# Patient Record
Sex: MALE | Race: WHITE | NOT HISPANIC OR LATINO | Employment: OTHER | URBAN - NONMETROPOLITAN AREA
[De-identification: names, ages, dates, MRNs, and addresses within clinical notes are randomized per-mention and may not be internally consistent; named-entity substitution may affect disease eponyms.]

---

## 2017-03-29 ENCOUNTER — OFFICE VISIT (OUTPATIENT)
Dept: ORTHOPEDIC SURGERY | Facility: CLINIC | Age: 52
End: 2017-03-29

## 2017-03-29 VITALS — BODY MASS INDEX: 35.22 KG/M2 | RESPIRATION RATE: 18 BRPM | WEIGHT: 251.6 LBS | HEIGHT: 71 IN

## 2017-03-29 DIAGNOSIS — S42.211A FRACTURE, HUMERUS, NECK, RIGHT, CLOSED, INITIAL ENCOUNTER: Primary | ICD-10-CM

## 2017-03-29 DIAGNOSIS — M25.511 RIGHT SHOULDER PAIN, UNSPECIFIED CHRONICITY: Primary | ICD-10-CM

## 2017-03-29 PROCEDURE — 99203 OFFICE O/P NEW LOW 30 MIN: CPT | Performed by: ORTHOPAEDIC SURGERY

## 2017-03-29 RX ORDER — LOSARTAN POTASSIUM AND HYDROCHLOROTHIAZIDE 25; 100 MG/1; MG/1
TABLET ORAL
Refills: 2 | COMMUNITY
Start: 2017-03-13 | End: 2020-08-30 | Stop reason: SDUPTHER

## 2017-03-29 RX ORDER — PYRAZINAMIDE 500 MG/1
1-2 TABLET ORAL EVERY 4 HOURS PRN
Qty: 60 TABLET | Refills: 0 | Status: SHIPPED | OUTPATIENT
Start: 2017-03-29 | End: 2017-03-29 | Stop reason: ALTCHOICE

## 2017-03-29 RX ORDER — FLUOXETINE HYDROCHLORIDE 40 MG/1
CAPSULE ORAL
Refills: 2 | COMMUNITY
Start: 2017-03-13 | End: 2020-06-27

## 2017-03-29 NOTE — PROGRESS NOTES
Subjective   Patient ID: Guero Casey is a 51 y.o. male  Pain and Edema of the Right Shoulder (Patient stated that on 3/25/2017 was sowing grass seed on an inclined area at home, laid straw and didn't realize the straw was wet, patient slipped and fell causing injury. Patient is right hand dominant.)             History of Present Illness  Complained of pain initially at the lateral aspect of his upper arm that radiated down towards his elbow since being seen in the emergency department elbow and wrist pain have subsided bruising has extended down anteriorly at the right proximal arm towards the biceps area.  Denies neck pain or paresthesias.  His more comfortable using a sling, medications for pain have helped he is on chronic Suboxone medications as well told by his PCP to follow-up here for further treatment and pain medications as needed.  Is chronically disabled.      Review of Systems   Constitutional: Negative for diaphoresis, fever and unexpected weight change.   HENT: Negative for dental problem and sore throat.    Eyes: Negative for visual disturbance.   Respiratory: Negative for shortness of breath.    Cardiovascular: Negative for chest pain.   Gastrointestinal: Negative for abdominal pain, constipation, diarrhea, nausea and vomiting.   Genitourinary: Negative for difficulty urinating and frequency.   Musculoskeletal: Positive for arthralgias.   Neurological: Negative for headaches.   Hematological: Does not bruise/bleed easily.   All other systems reviewed and are negative.      Past Medical History:   Diagnosis Date   • Anxiety    • Back pain    • Depression    • Depression with anxiety    • GERD (gastroesophageal reflux disease)    • Hyperlipidemia    • Hypertension    • Hypothyroidism    • Leg pain    • PVD (peripheral vascular disease)         Past Surgical History:   Procedure Laterality Date   • ABDOMINAL SURGERY      Exploratory   • CHOLECYSTECTOMY     • FEMORAL POPLITEAL BYPASS Left      "04/19/2016   • HERNIA REPAIR      x2       Family History   Problem Relation Age of Onset   • Coronary artery disease Mother    • Depression Mother    • Stroke Father    • Cerebral aneurysm Father        Social History     Social History   • Marital status:      Spouse name: N/A   • Number of children: N/A   • Years of education: N/A     Occupational History   • Not on file.     Social History Main Topics   • Smoking status: Current Every Day Smoker     Years: 30.00     Types: Cigarettes   • Smokeless tobacco: Not on file      Comment: Smoking 4 cigarettes a day   • Alcohol use No   • Drug use: Not on file   • Sexual activity: Not on file     Other Topics Concern   • Not on file     Social History Narrative       No Known Allergies    Objective   Resp 18  Ht 71\" (180.3 cm)  Wt 251 lb 9.6 oz (114 kg)  BMI 35.09 kg/m2   Physical Exam  Constitutional: He is oriented to person, place, and time. He appears well-developed and well-nourished.   HENT:   Head: Normocephalic and atraumatic.   Eyes: EOM are normal. Pupils are equal, round, and reactive to light.   Neck: Normal range of motion. Neck supple.   Cardiovascular: Normal rate.    Pulmonary/Chest: Effort normal and breath sounds normal.   Abdominal: Soft.   Neurological: He is alert and oriented to person, place, and time.   Skin: Skin is warm and dry.   Psychiatric: He has a normal mood and affect.   Nursing note and vitals reviewed.       Ortho Exam   Right proximal humerus with ecchymosis tenderness at the proximal lateral aspect of the upper arm, continued good joint nontender non-ecchymotic no tenderness at the distal clavicle, arm with minimal swelling distal to the elbow no radial head tenderness full supination pronation full wrist volar flexion dorsiflexion with no pain at the distal radius anatomic snuffbox or proximal carpal row.  Gross sensory motor function intact to the right hand limited forward flexion the shoulder with pain anteriorly at the " right proximal humerus.    Assessment/Plan   Review of Radiographic Studies:    Radiographic images today of fracture I personally viewed and confirm satisfactory alignment.      Procedures        Orthopedic activities reviewed and patient expressed appreciation and Risk, benefits, and merits of treatment alternatives reviewed with the patient and questions answered      Recommendations/Plan:   Work/Activity Status: No use of involved extremity      Patient agreeable to call or return sooner for any concerns.             Impression:  Nondisplaced right greater tuberosity proximal humerus fracture  Plan:  Follow up 2 weeks right shoulder x-rays 3 views on arrival to confirm no further displacement of tuberosity fracture, Tylenol 3 w codeine for pain, icing, continued use of sling, will refer to therapy after next visit    Discussed use of his narcotic medications with his chronic pain management specialist who would prefer to increase his Suboxone dose and add hydrocodone rather than use Tylenol No. 3 with codeine.  The pain management specialist will manage his pain medications

## 2017-04-07 DIAGNOSIS — Z09 FOLLOW-UP EXAM: Primary | ICD-10-CM

## 2017-04-12 ENCOUNTER — OFFICE VISIT (OUTPATIENT)
Dept: ORTHOPEDIC SURGERY | Facility: CLINIC | Age: 52
End: 2017-04-12

## 2017-04-12 VITALS — RESPIRATION RATE: 19 BRPM | WEIGHT: 251.6 LBS | HEIGHT: 71 IN | BODY MASS INDEX: 35.22 KG/M2

## 2017-04-12 DIAGNOSIS — S42.294D OTHER CLOSED NONDISPLACED FRACTURE OF PROXIMAL END OF RIGHT HUMERUS WITH ROUTINE HEALING, SUBSEQUENT ENCOUNTER: Primary | ICD-10-CM

## 2017-04-12 DIAGNOSIS — Z09 FOLLOW-UP EXAM: ICD-10-CM

## 2017-04-12 PROCEDURE — 99213 OFFICE O/P EST LOW 20 MIN: CPT | Performed by: ORTHOPAEDIC SURGERY

## 2017-04-12 NOTE — PROGRESS NOTES
Subjective   Patient ID: Guero Casey is a 51 y.o. male  Pain and Follow-up of the Right Shoulder (Patient stated is experiencing an increase in pain. Patient is right hand dominant.)             History of Present Illness    Here for routine follow-up proximal humeral fracture.  Has cramping and spasm in the anterior biceps region feels better without use of his sling no new injuries    Review of Systems   Constitutional: Negative for diaphoresis, fever and unexpected weight change.   HENT: Negative for dental problem and sore throat.    Eyes: Negative for visual disturbance.   Respiratory: Negative for shortness of breath.    Cardiovascular: Negative for chest pain.   Gastrointestinal: Negative for abdominal pain, constipation, diarrhea, nausea and vomiting.   Genitourinary: Negative for difficulty urinating and frequency.   Musculoskeletal: Positive for arthralgias.   Neurological: Negative for headaches.   Hematological: Does not bruise/bleed easily.   All other systems reviewed and are negative.      Past Medical History:   Diagnosis Date   • Anxiety    • Back pain    • Depression    • Depression with anxiety    • Fracture, humerus     Right    • GERD (gastroesophageal reflux disease)    • Hyperlipidemia    • Hypertension    • Hypothyroidism    • Leg pain    • Prostate enlargement    • PVD (peripheral vascular disease)         Past Surgical History:   Procedure Laterality Date   • ABDOMINAL SURGERY      Exploratory   • CHOLECYSTECTOMY     • FEMORAL POPLITEAL BYPASS Left     04/19/2016   • HERNIA REPAIR      x2       Family History   Problem Relation Age of Onset   • Coronary artery disease Mother    • Depression Mother    • Osteoporosis Mother    • Hypertension Mother    • Stroke Father    • Cerebral aneurysm Father    • Diverticulitis Father        Social History     Social History   • Marital status:      Spouse name: N/A   • Number of children: N/A   • Years of education: N/A     Occupational  "History   • Not on file.     Social History Main Topics   • Smoking status: Current Every Day Smoker     Packs/day: 1.00     Years: 20.00     Types: Cigarettes   • Smokeless tobacco: Not on file      Comment: Smoking 4 cigarettes a day   • Alcohol use No   • Drug use: Yes      Comment: Previously    • Sexual activity: Defer     Other Topics Concern   • Not on file     Social History Narrative       No Known Allergies    Objective   Resp 19  Ht 71\" (180.3 cm)  Wt 251 lb 9.6 oz (114 kg)  BMI 35.09 kg/m2   Physical Exam  Constitutional: He is oriented to person, place, and time. He appears well-developed and well-nourished.   HENT:   Head: Normocephalic and atraumatic.   Eyes: EOM are normal. Pupils are equal, round, and reactive to light.   Neck: Normal range of motion. Neck supple.   Cardiovascular: Normal rate.    Pulmonary/Chest: Effort normal and breath sounds normal.   Abdominal: Soft.   Neurological: He is alert and oriented to person, place, and time.   Skin: Skin is warm and dry.   Psychiatric: He has a normal mood and affect.   Nursing note and vitals reviewed.       Ortho Exam   Right shoulder tender at the proximal humeral area much less swelling proximally but minimal to moderate swelling at the elbow and forearm no tenderness at the radial head, lateral or medial condylar regions of the elbow, arm neurovascularly intact    Assessment/Plan   Review of Radiographic Studies:    Radiographic images today of fracture I personally viewed and confirm satisfactory alignment.      Procedures        Physical therapy referral given      Recommendations/Plan:   Work/Activity Status: No use of involved extremity      Patient agreeable to call or return sooner for any concerns.             Impression:  Healing right hand dominant proximal humeral nondisplaced greater tuberosity fracture  Plan:  Refer to therapy for mobilization as tolerated, recheck 3 weeks x-rays right shoulder 3 views at that time  "

## 2017-05-02 DIAGNOSIS — Z09 FOLLOW-UP EXAM: Primary | ICD-10-CM

## 2017-05-05 DIAGNOSIS — Z09 FOLLOW-UP EXAM: Primary | ICD-10-CM

## 2018-11-02 ENCOUNTER — TRANSCRIBE ORDERS (OUTPATIENT)
Dept: ULTRASOUND IMAGING | Facility: HOSPITAL | Age: 53
End: 2018-11-02

## 2018-11-02 DIAGNOSIS — I73.9 CLAUDICATION (HCC): Primary | ICD-10-CM

## 2019-01-09 RX ORDER — ASPIRIN 81 MG/1
TABLET ORAL
Qty: 30 TABLET | OUTPATIENT
Start: 2019-01-09

## 2019-01-09 RX ORDER — CARVEDILOL 25 MG/1
TABLET ORAL
Qty: 60 TABLET | OUTPATIENT
Start: 2019-01-09

## 2019-01-09 RX ORDER — ATORVASTATIN CALCIUM 40 MG/1
TABLET, FILM COATED ORAL
Qty: 30 TABLET | OUTPATIENT
Start: 2019-01-09

## 2019-01-09 RX ORDER — HYDRALAZINE HYDROCHLORIDE 100 MG/1
TABLET, FILM COATED ORAL
Qty: 90 TABLET | OUTPATIENT
Start: 2019-01-09

## 2019-01-15 RX ORDER — CARVEDILOL 25 MG/1
TABLET ORAL
Qty: 60 TABLET | OUTPATIENT
Start: 2019-01-15

## 2019-01-15 RX ORDER — LOSARTAN POTASSIUM AND HYDROCHLOROTHIAZIDE 25; 100 MG/1; MG/1
TABLET ORAL
Qty: 30 TABLET | OUTPATIENT
Start: 2019-01-15

## 2019-02-27 ENCOUNTER — TRANSCRIBE ORDERS (OUTPATIENT)
Dept: ULTRASOUND IMAGING | Facility: HOSPITAL | Age: 54
End: 2019-02-27

## 2019-02-27 DIAGNOSIS — I10 HYPERTENSION, UNSPECIFIED TYPE: Primary | ICD-10-CM

## 2019-04-04 ENCOUNTER — TRANSCRIBE ORDERS (OUTPATIENT)
Dept: ULTRASOUND IMAGING | Facility: HOSPITAL | Age: 54
End: 2019-04-04

## 2019-04-04 DIAGNOSIS — R89.9 ABNORMAL LABORATORY TEST: Primary | ICD-10-CM

## 2019-04-11 RX ORDER — LANSOPRAZOLE 15 MG/1
CAPSULE, DELAYED RELEASE ORAL
Qty: 30 CAPSULE | Refills: 5 | Status: SHIPPED | OUTPATIENT
Start: 2019-04-11 | End: 2020-08-30 | Stop reason: SDUPTHER

## 2019-05-06 RX ORDER — CETIRIZINE HYDROCHLORIDE 10 MG/1
TABLET ORAL
Qty: 30 TABLET | OUTPATIENT
Start: 2019-05-06

## 2019-05-09 RX ORDER — TIOTROPIUM BROMIDE INHALATION SPRAY 3.12 UG/1
SPRAY, METERED RESPIRATORY (INHALATION)
Qty: 4 G | OUTPATIENT
Start: 2019-05-09

## 2019-05-31 ENCOUNTER — TRANSCRIBE ORDERS (OUTPATIENT)
Dept: ULTRASOUND IMAGING | Facility: HOSPITAL | Age: 54
End: 2019-05-31

## 2019-05-31 DIAGNOSIS — I10 UNCONTROLLED HYPERTENSION: Primary | ICD-10-CM

## 2019-06-10 DIAGNOSIS — R07.9 CHEST PAIN, UNSPECIFIED TYPE: Primary | ICD-10-CM

## 2019-06-10 DIAGNOSIS — I10 HYPERTENSION, UNSPECIFIED TYPE: ICD-10-CM

## 2019-06-14 RX ORDER — TIOTROPIUM BROMIDE INHALATION SPRAY 3.12 UG/1
SPRAY, METERED RESPIRATORY (INHALATION)
Qty: 4 G | OUTPATIENT
Start: 2019-06-14

## 2019-07-08 RX ORDER — IBUPROFEN 800 MG/1
TABLET ORAL
Qty: 120 TABLET | OUTPATIENT
Start: 2019-07-08

## 2019-07-08 RX ORDER — CILOSTAZOL 50 MG/1
TABLET ORAL
Qty: 30 TABLET | OUTPATIENT
Start: 2019-07-08

## 2019-07-08 RX ORDER — LOSARTAN POTASSIUM AND HYDROCHLOROTHIAZIDE 25; 100 MG/1; MG/1
TABLET ORAL
Qty: 30 TABLET | OUTPATIENT
Start: 2019-07-08

## 2019-07-08 RX ORDER — DULOXETIN HYDROCHLORIDE 60 MG/1
CAPSULE, DELAYED RELEASE ORAL
Qty: 30 CAPSULE | OUTPATIENT
Start: 2019-07-08

## 2020-02-04 ENCOUNTER — TELEPHONE (OUTPATIENT)
Dept: CARDIOLOGY | Facility: CLINIC | Age: 55
End: 2020-02-04

## 2020-02-04 NOTE — TELEPHONE ENCOUNTER
Attempted to contact patient in regards to missed appointment. Patient's number is invalid.  Letter mailed to patient.

## 2020-05-10 ENCOUNTER — HOSPITAL ENCOUNTER (EMERGENCY)
Facility: HOSPITAL | Age: 55
Discharge: HOME | End: 2020-05-10
Attending: EMERGENCY MEDICINE | Admitting: EMERGENCY MEDICINE

## 2020-05-10 ENCOUNTER — APPOINTMENT (OUTPATIENT)
Dept: GENERAL RADIOLOGY | Facility: HOSPITAL | Age: 55
End: 2020-05-10

## 2020-05-10 ENCOUNTER — APPOINTMENT (OUTPATIENT)
Dept: CT IMAGING | Facility: HOSPITAL | Age: 55
End: 2020-05-10

## 2020-05-10 VITALS
BODY MASS INDEX: 36.4 KG/M2 | DIASTOLIC BLOOD PRESSURE: 99 MMHG | HEART RATE: 60 BPM | OXYGEN SATURATION: 96 % | TEMPERATURE: 98 F | RESPIRATION RATE: 18 BRPM | HEIGHT: 71 IN | WEIGHT: 260 LBS | SYSTOLIC BLOOD PRESSURE: 159 MMHG

## 2020-05-10 DIAGNOSIS — R53.83 OTHER FATIGUE: Primary | ICD-10-CM

## 2020-05-10 LAB
A-A DO2: 29.3 MMHG
ALBUMIN SERPL-MCNC: 4 G/DL (ref 3.5–5.2)
ALBUMIN/GLOB SERPL: 1.4 G/DL
ALP SERPL-CCNC: 67 U/L (ref 39–117)
ALT SERPL W P-5'-P-CCNC: 22 U/L (ref 1–41)
AMPHET+METHAMPHET UR QL: POSITIVE
AMPHETAMINES UR QL: POSITIVE
ANION GAP SERPL CALCULATED.3IONS-SCNC: 13.8 MMOL/L (ref 5–15)
APAP SERPL-MCNC: <5 MCG/ML (ref 10–30)
ARTERIAL PATENCY WRIST A: POSITIVE
AST SERPL-CCNC: 22 U/L (ref 1–40)
ATMOSPHERIC PRESS: 734 MMHG
BARBITURATES UR QL SCN: NEGATIVE
BASE EXCESS BLDA CALC-SCNC: 4.2 MMOL/L (ref 0–2)
BASOPHILS # BLD AUTO: 0.05 10*3/MM3 (ref 0–0.2)
BASOPHILS NFR BLD AUTO: 0.6 % (ref 0–1.5)
BDY SITE: ABNORMAL
BENZODIAZ UR QL SCN: NEGATIVE
BILIRUB SERPL-MCNC: 0.2 MG/DL (ref 0.2–1.2)
BILIRUB UR QL STRIP: NEGATIVE
BUN BLD-MCNC: 21 MG/DL (ref 6–20)
BUN/CREAT SERPL: 20.2 (ref 7–25)
BUPRENORPHINE SERPL-MCNC: NEGATIVE NG/ML
CALCIUM SPEC-SCNC: 9.9 MG/DL (ref 8.6–10.5)
CANNABINOIDS SERPL QL: NEGATIVE
CHLORIDE SERPL-SCNC: 102 MMOL/L (ref 98–107)
CLARITY UR: CLEAR
CO2 SERPL-SCNC: 28.2 MMOL/L (ref 22–29)
COCAINE UR QL: NEGATIVE
COHGB MFR BLD: 1.3 % (ref 0–2)
COLOR UR: YELLOW
CREAT BLD-MCNC: 1.04 MG/DL (ref 0.76–1.27)
DEPRECATED RDW RBC AUTO: 39.6 FL (ref 37–54)
EOSINOPHIL # BLD AUTO: 0.64 10*3/MM3 (ref 0–0.4)
EOSINOPHIL NFR BLD AUTO: 7.1 % (ref 0.3–6.2)
ERYTHROCYTE [DISTWIDTH] IN BLOOD BY AUTOMATED COUNT: 12.7 % (ref 12.3–15.4)
ETHANOL BLD-MCNC: <10 MG/DL (ref 0–10)
ETHANOL UR QL: <0.01 %
GFR SERPL CREATININE-BSD FRML MDRD: 74 ML/MIN/1.73
GLOBULIN UR ELPH-MCNC: 2.8 GM/DL
GLUCOSE BLD-MCNC: 132 MG/DL (ref 65–99)
GLUCOSE UR STRIP-MCNC: NEGATIVE MG/DL
HCO3 BLDA-SCNC: 29.2 MMOL/L (ref 22–28)
HCT VFR BLD AUTO: 41.7 % (ref 37.5–51)
HCT VFR BLD CALC: 41.8 %
HGB BLD-MCNC: 14.2 G/DL (ref 13–17.7)
HGB BLDA-MCNC: 13.6 G/DL (ref 12–18)
HGB UR QL STRIP.AUTO: NEGATIVE
HOLD SPECIMEN: NORMAL
HOLD SPECIMEN: NORMAL
HOROWITZ INDEX BLD+IHG-RTO: 21 %
IMM GRANULOCYTES # BLD AUTO: 0.03 10*3/MM3 (ref 0–0.05)
IMM GRANULOCYTES NFR BLD AUTO: 0.3 % (ref 0–0.5)
KETONES UR QL STRIP: NEGATIVE
LEUKOCYTE ESTERASE UR QL STRIP.AUTO: NEGATIVE
LYMPHOCYTES # BLD AUTO: 1.99 10*3/MM3 (ref 0.7–3.1)
LYMPHOCYTES NFR BLD AUTO: 22 % (ref 19.6–45.3)
MCH RBC QN AUTO: 29.2 PG (ref 26.6–33)
MCHC RBC AUTO-ENTMCNC: 34.1 G/DL (ref 31.5–35.7)
MCV RBC AUTO: 85.8 FL (ref 79–97)
METHADONE UR QL SCN: NEGATIVE
METHGB BLD QL: 0.9 % (ref 0–1.5)
MODALITY: ABNORMAL
MONOCYTES # BLD AUTO: 0.61 10*3/MM3 (ref 0.1–0.9)
MONOCYTES NFR BLD AUTO: 6.7 % (ref 5–12)
NEUTROPHILS # BLD AUTO: 5.74 10*3/MM3 (ref 1.7–7)
NEUTROPHILS NFR BLD AUTO: 63.3 % (ref 42.7–76)
NITRITE UR QL STRIP: NEGATIVE
NOTE: ABNORMAL
NRBC BLD AUTO-RTO: 0 /100 WBC (ref 0–0.2)
OPIATES UR QL: NEGATIVE
OXYCODONE UR QL SCN: NEGATIVE
OXYHGB MFR BLDV: 90.8 % (ref 94–99)
PCO2 BLDA: 44 MM HG (ref 35–45)
PCO2 TEMP ADJ BLD: ABNORMAL MM[HG]
PCP UR QL SCN: NEGATIVE
PH BLDA: 7.43 PH UNITS (ref 7.3–7.5)
PH UR STRIP.AUTO: <=5 [PH] (ref 5–8)
PH, TEMP CORRECTED: ABNORMAL
PLATELET # BLD AUTO: 246 10*3/MM3 (ref 140–450)
PMV BLD AUTO: 9 FL (ref 6–12)
PO2 BLDA: 65.3 MM HG (ref 75–100)
PO2 TEMP ADJ BLD: ABNORMAL MM[HG]
POTASSIUM BLD-SCNC: 3.8 MMOL/L (ref 3.5–5.2)
PROPOXYPH UR QL: NEGATIVE
PROT SERPL-MCNC: 6.8 G/DL (ref 6–8.5)
PROT UR QL STRIP: NEGATIVE
RBC # BLD AUTO: 4.86 10*6/MM3 (ref 4.14–5.8)
SALICYLATES SERPL-MCNC: <0.3 MG/DL
SAO2 % BLDCOA: 92.8 % (ref 94–100)
SODIUM BLD-SCNC: 144 MMOL/L (ref 136–145)
SP GR UR STRIP: 1.03 (ref 1–1.03)
TRICYCLICS UR QL SCN: NEGATIVE
TROPONIN T SERPL-MCNC: 0.02 NG/ML (ref 0–0.03)
UROBILINOGEN UR QL STRIP: NORMAL
VENTILATOR MODE: ABNORMAL
WBC NRBC COR # BLD: 9.06 10*3/MM3 (ref 3.4–10.8)
WHOLE BLOOD HOLD SPECIMEN: NORMAL
WHOLE BLOOD HOLD SPECIMEN: NORMAL

## 2020-05-10 PROCEDURE — 85025 COMPLETE CBC W/AUTO DIFF WBC: CPT | Performed by: EMERGENCY MEDICINE

## 2020-05-10 PROCEDURE — 36600 WITHDRAWAL OF ARTERIAL BLOOD: CPT

## 2020-05-10 PROCEDURE — 80307 DRUG TEST PRSMV CHEM ANLYZR: CPT | Performed by: EMERGENCY MEDICINE

## 2020-05-10 PROCEDURE — 81003 URINALYSIS AUTO W/O SCOPE: CPT | Performed by: EMERGENCY MEDICINE

## 2020-05-10 PROCEDURE — 82375 ASSAY CARBOXYHB QUANT: CPT

## 2020-05-10 PROCEDURE — 93005 ELECTROCARDIOGRAM TRACING: CPT | Performed by: EMERGENCY MEDICINE

## 2020-05-10 PROCEDURE — 70450 CT HEAD/BRAIN W/O DYE: CPT

## 2020-05-10 PROCEDURE — 83050 HGB METHEMOGLOBIN QUAN: CPT

## 2020-05-10 PROCEDURE — 99284 EMERGENCY DEPT VISIT MOD MDM: CPT

## 2020-05-10 PROCEDURE — 71045 X-RAY EXAM CHEST 1 VIEW: CPT

## 2020-05-10 PROCEDURE — 82805 BLOOD GASES W/O2 SATURATION: CPT

## 2020-05-10 PROCEDURE — 80053 COMPREHEN METABOLIC PANEL: CPT | Performed by: EMERGENCY MEDICINE

## 2020-05-10 PROCEDURE — 84484 ASSAY OF TROPONIN QUANT: CPT | Performed by: EMERGENCY MEDICINE

## 2020-05-10 RX ORDER — ONDANSETRON 2 MG/ML
INJECTION INTRAMUSCULAR; INTRAVENOUS
Status: DISCONTINUED
Start: 2020-05-10 | End: 2020-05-10 | Stop reason: WASHOUT

## 2020-05-10 RX ORDER — SODIUM CHLORIDE 0.9 % (FLUSH) 0.9 %
10 SYRINGE (ML) INJECTION AS NEEDED
Status: DISCONTINUED | OUTPATIENT
Start: 2020-05-10 | End: 2020-05-11 | Stop reason: HOSPADM

## 2020-05-11 NOTE — ED PROVIDER NOTES
"Subjective   54-year-old male presents to the ED for chief complaint of \"trouble remembering things\".  Patient states that he is having trouble with his short-term memory and that it is worse when he gets up and walks around the house.  He admits to not taking any of his prescribed medications for at least 1 month.  He is complaining of generalized fatigue and weakness.  He denies headache.  No lightheadedness or dizziness.  No nausea vomiting diarrhea abdominal pain.  No chest pain or shortness of breath.  No fever chills.  No other complaints at this time.          Review of Systems   Neurological: Positive for weakness.   Psychiatric/Behavioral: Positive for confusion.   All other systems reviewed and are negative.      Past Medical History:   Diagnosis Date   • Anxiety    • Back pain    • Depression    • Depression with anxiety    • Diabetes mellitus (CMS/[facility])    • Fracture, humerus 03/25/2017    Right    • GERD (gastroesophageal reflux disease)    • Hyperlipidemia    • Hypertension    • Hypothyroidism    • Leg pain    • Prostate enlargement    • PVD (peripheral vascular disease) (CMS/[facility])        No Known Allergies    Past Surgical History:   Procedure Laterality Date   • ABDOMINAL SURGERY      Exploratory   • CHOLECYSTECTOMY     • FEMORAL POPLITEAL BYPASS Left     04/19/2016   • HERNIA REPAIR      x2       Family History   Problem Relation Age of Onset   • Coronary artery disease Mother    • Depression Mother    • Osteoporosis Mother    • Hypertension Mother    • Stroke Father    • Cerebral aneurysm Father    • Diverticulitis Father        Social History     Socioeconomic History   • Marital status:      Spouse name: Not on file   • Number of children: Not on file   • Years of education: Not on file   • Highest education level: Not on file   Tobacco Use   • Smoking status: Current Every Day Smoker     Packs/day: 0.50     Years: 20.00     Pack years: 10.00     Types: Cigarettes   • Tobacco comment: " Smoking 4 cigarettes a day   Substance and Sexual Activity   • Alcohol use: No   • Drug use: Yes     Comment: Previously    • Sexual activity: Defer           Objective   Physical Exam   Constitutional: He is oriented to person, place, and time. No distress.   Chronically ill-appearing.   HENT:   Head: Normocephalic and atraumatic.   Nose: Nose normal.   Eyes: Pupils are equal, round, and reactive to light. Conjunctivae and EOM are normal.   Cardiovascular: Normal rate and regular rhythm.   Pulmonary/Chest: Effort normal and breath sounds normal. No respiratory distress.   Abdominal: Soft. He exhibits no distension. There is no tenderness.   Musculoskeletal: He exhibits no edema or deformity.   Neurological: He is alert and oriented to person, place, and time. No cranial nerve deficit. Coordination normal.   Oriented x4.  No focal neurological deficits.  Cranial nerves II through XII grossly intact.  Strength and sensation in all extremities intact.   Skin: He is not diaphoretic.   Nursing note and vitals reviewed.      Procedures           ED Course      PULSE OXIMETRY INTERPRETATION  Patient had a pulse ox of 97% on room air. This is a normal pulse oximetry reading.     EKG interpreted by me.  Sinus rhythm.  Rate of 67.  Left axis deviation.  Nonspecific T wave abnormalities in the lateral leads.  Abnormal EKG.                                MDM  54-year-old male complaining of trouble remembering things.  On my evaluation the patient was awake alert and oriented x4.  He had no focal neurological deficits or cranial nerve deficits.  Will obtain basic blood work chest x-ray and CT of the brain to rule out any organic causes.      CT brain negative for acute process.  Urinalysis negative for infection.  Laboratories also reassuring.  Patient was hemodynamically stable.  Afebrile.  On presentation to the ED was awake alert and oriented x4.  On my reevaluation he was awake alert and oriented x4.  No signs of  confusion.  He is appropriate for discharge to follow-up as needed.      Final diagnoses:   Other fatigue            Luke Gonzalez, DO  05/10/20 5299

## 2020-06-27 ENCOUNTER — HOSPITAL ENCOUNTER (EMERGENCY)
Facility: HOSPITAL | Age: 55
Discharge: HOME | End: 2020-06-27
Attending: EMERGENCY MEDICINE | Admitting: EMERGENCY MEDICINE

## 2020-06-27 ENCOUNTER — APPOINTMENT (OUTPATIENT)
Dept: GENERAL RADIOLOGY | Facility: HOSPITAL | Age: 55
End: 2020-06-27

## 2020-06-27 VITALS
OXYGEN SATURATION: 97 % | HEART RATE: 77 BPM | WEIGHT: 250 LBS | RESPIRATION RATE: 18 BRPM | HEIGHT: 71 IN | TEMPERATURE: 98.1 F | BODY MASS INDEX: 35 KG/M2 | SYSTOLIC BLOOD PRESSURE: 186 MMHG | DIASTOLIC BLOOD PRESSURE: 115 MMHG

## 2020-06-27 DIAGNOSIS — I10 HYPERTENSION, UNSPECIFIED TYPE: ICD-10-CM

## 2020-06-27 DIAGNOSIS — F32.A DEPRESSION, UNSPECIFIED DEPRESSION TYPE: Primary | ICD-10-CM

## 2020-06-27 LAB
ALBUMIN SERPL-MCNC: 4 G/DL (ref 3.5–5.2)
ALBUMIN/GLOB SERPL: 1.4 G/DL
ALP SERPL-CCNC: 70 U/L (ref 39–117)
ALT SERPL W P-5'-P-CCNC: 13 U/L (ref 1–41)
AMPHET+METHAMPHET UR QL: NEGATIVE
AMPHETAMINES UR QL: NEGATIVE
ANION GAP SERPL CALCULATED.3IONS-SCNC: 9.8 MMOL/L (ref 5–15)
AST SERPL-CCNC: 14 U/L (ref 1–40)
BARBITURATES UR QL SCN: NEGATIVE
BASOPHILS # BLD AUTO: 0.05 10*3/MM3 (ref 0–0.2)
BASOPHILS NFR BLD AUTO: 0.6 % (ref 0–1.5)
BENZODIAZ UR QL SCN: NEGATIVE
BILIRUB SERPL-MCNC: 0.2 MG/DL (ref 0.2–1.2)
BILIRUB UR QL STRIP: NEGATIVE
BUN BLD-MCNC: 13 MG/DL (ref 6–20)
BUN/CREAT SERPL: 13.1 (ref 7–25)
BUPRENORPHINE SERPL-MCNC: NEGATIVE NG/ML
CALCIUM SPEC-SCNC: 9.1 MG/DL (ref 8.6–10.5)
CANNABINOIDS SERPL QL: NEGATIVE
CHLORIDE SERPL-SCNC: 102 MMOL/L (ref 98–107)
CLARITY UR: CLEAR
CO2 SERPL-SCNC: 27.2 MMOL/L (ref 22–29)
COCAINE UR QL: NEGATIVE
COLOR UR: YELLOW
CREAT BLD-MCNC: 0.99 MG/DL (ref 0.76–1.27)
DEPRECATED RDW RBC AUTO: 39.6 FL (ref 37–54)
EOSINOPHIL # BLD AUTO: 0.49 10*3/MM3 (ref 0–0.4)
EOSINOPHIL NFR BLD AUTO: 5.8 % (ref 0.3–6.2)
ERYTHROCYTE [DISTWIDTH] IN BLOOD BY AUTOMATED COUNT: 12.9 % (ref 12.3–15.4)
GFR SERPL CREATININE-BSD FRML MDRD: 78 ML/MIN/1.73
GLOBULIN UR ELPH-MCNC: 2.9 GM/DL
GLUCOSE BLD-MCNC: 102 MG/DL (ref 65–99)
GLUCOSE BLDC GLUCOMTR-MCNC: 101 MG/DL (ref 70–130)
GLUCOSE UR STRIP-MCNC: NEGATIVE MG/DL
HCT VFR BLD AUTO: 40.8 % (ref 37.5–51)
HGB BLD-MCNC: 14 G/DL (ref 13–17.7)
HGB UR QL STRIP.AUTO: NEGATIVE
HOLD SPECIMEN: NORMAL
HOLD SPECIMEN: NORMAL
IMM GRANULOCYTES # BLD AUTO: 0.04 10*3/MM3 (ref 0–0.05)
IMM GRANULOCYTES NFR BLD AUTO: 0.5 % (ref 0–0.5)
KETONES UR QL STRIP: NEGATIVE
LEUKOCYTE ESTERASE UR QL STRIP.AUTO: NEGATIVE
LYMPHOCYTES # BLD AUTO: 2.02 10*3/MM3 (ref 0.7–3.1)
LYMPHOCYTES NFR BLD AUTO: 24.1 % (ref 19.6–45.3)
MAGNESIUM SERPL-MCNC: 2.1 MG/DL (ref 1.6–2.6)
MCH RBC QN AUTO: 28.9 PG (ref 26.6–33)
MCHC RBC AUTO-ENTMCNC: 34.3 G/DL (ref 31.5–35.7)
MCV RBC AUTO: 84.3 FL (ref 79–97)
METHADONE UR QL SCN: NEGATIVE
MONOCYTES # BLD AUTO: 0.71 10*3/MM3 (ref 0.1–0.9)
MONOCYTES NFR BLD AUTO: 8.5 % (ref 5–12)
NEUTROPHILS # BLD AUTO: 5.07 10*3/MM3 (ref 1.7–7)
NEUTROPHILS NFR BLD AUTO: 60.5 % (ref 42.7–76)
NITRITE UR QL STRIP: NEGATIVE
NRBC BLD AUTO-RTO: 0 /100 WBC (ref 0–0.2)
OPIATES UR QL: NEGATIVE
OXYCODONE UR QL SCN: NEGATIVE
PCP UR QL SCN: NEGATIVE
PH UR STRIP.AUTO: 6 [PH] (ref 5–8)
PLATELET # BLD AUTO: 235 10*3/MM3 (ref 140–450)
PMV BLD AUTO: 9.3 FL (ref 6–12)
POTASSIUM BLD-SCNC: 3.3 MMOL/L (ref 3.5–5.2)
PROPOXYPH UR QL: NEGATIVE
PROT SERPL-MCNC: 6.9 G/DL (ref 6–8.5)
PROT UR QL STRIP: NEGATIVE
RBC # BLD AUTO: 4.84 10*6/MM3 (ref 4.14–5.8)
SODIUM BLD-SCNC: 139 MMOL/L (ref 136–145)
SP GR UR STRIP: 1.01 (ref 1–1.03)
TRICYCLICS UR QL SCN: NEGATIVE
TROPONIN T SERPL-MCNC: <0.01 NG/ML (ref 0–0.03)
UROBILINOGEN UR QL STRIP: NORMAL
WBC NRBC COR # BLD: 8.38 10*3/MM3 (ref 3.4–10.8)
WHOLE BLOOD HOLD SPECIMEN: NORMAL
WHOLE BLOOD HOLD SPECIMEN: NORMAL

## 2020-06-27 PROCEDURE — 71045 X-RAY EXAM CHEST 1 VIEW: CPT

## 2020-06-27 PROCEDURE — 83735 ASSAY OF MAGNESIUM: CPT

## 2020-06-27 PROCEDURE — 80306 DRUG TEST PRSMV INSTRMNT: CPT | Performed by: PHYSICIAN ASSISTANT

## 2020-06-27 PROCEDURE — 81003 URINALYSIS AUTO W/O SCOPE: CPT

## 2020-06-27 PROCEDURE — 85025 COMPLETE CBC W/AUTO DIFF WBC: CPT

## 2020-06-27 PROCEDURE — 93005 ELECTROCARDIOGRAM TRACING: CPT

## 2020-06-27 PROCEDURE — 80053 COMPREHEN METABOLIC PANEL: CPT

## 2020-06-27 PROCEDURE — 82962 GLUCOSE BLOOD TEST: CPT

## 2020-06-27 PROCEDURE — 84484 ASSAY OF TROPONIN QUANT: CPT

## 2020-06-27 PROCEDURE — 99284 EMERGENCY DEPT VISIT MOD MDM: CPT

## 2020-06-27 RX ORDER — SODIUM CHLORIDE 0.9 % (FLUSH) 0.9 %
10 SYRINGE (ML) INJECTION AS NEEDED
Status: DISCONTINUED | OUTPATIENT
Start: 2020-06-27 | End: 2020-06-27 | Stop reason: HOSPADM

## 2020-06-27 RX ORDER — HYDROXYZINE PAMOATE 50 MG/1
50 CAPSULE ORAL 3 TIMES DAILY PRN
Qty: 21 CAPSULE | Refills: 0 | OUTPATIENT
Start: 2020-06-27 | End: 2020-08-30

## 2020-06-27 RX ORDER — CLONIDINE HYDROCHLORIDE 0.1 MG/1
0.1 TABLET ORAL ONCE
Status: COMPLETED | OUTPATIENT
Start: 2020-06-27 | End: 2020-06-27

## 2020-06-27 RX ADMIN — CLONIDINE HYDROCHLORIDE 0.1 MG: 0.1 TABLET ORAL at 14:05

## 2020-08-26 ENCOUNTER — APPOINTMENT (OUTPATIENT)
Dept: GENERAL RADIOLOGY | Facility: HOSPITAL | Age: 55
End: 2020-08-26

## 2020-08-26 ENCOUNTER — HOSPITAL ENCOUNTER (EMERGENCY)
Facility: HOSPITAL | Age: 55
Discharge: HOME | End: 2020-08-26
Attending: EMERGENCY MEDICINE | Admitting: EMERGENCY MEDICINE

## 2020-08-26 VITALS
HEIGHT: 71 IN | OXYGEN SATURATION: 96 % | WEIGHT: 265 LBS | RESPIRATION RATE: 18 BRPM | HEART RATE: 75 BPM | BODY MASS INDEX: 37.1 KG/M2 | SYSTOLIC BLOOD PRESSURE: 182 MMHG | TEMPERATURE: 98.8 F | DIASTOLIC BLOOD PRESSURE: 108 MMHG

## 2020-08-26 DIAGNOSIS — R07.89 ATYPICAL CHEST PAIN: Primary | ICD-10-CM

## 2020-08-26 LAB
ALBUMIN SERPL-MCNC: 4.1 G/DL (ref 3.5–5.2)
ALBUMIN/GLOB SERPL: 1.4 G/DL
ALP SERPL-CCNC: 69 U/L (ref 39–117)
ALT SERPL W P-5'-P-CCNC: 11 U/L (ref 1–41)
ANION GAP SERPL CALCULATED.3IONS-SCNC: 12.1 MMOL/L (ref 5–15)
AST SERPL-CCNC: 15 U/L (ref 1–40)
BASOPHILS # BLD AUTO: 0.05 10*3/MM3 (ref 0–0.2)
BASOPHILS NFR BLD AUTO: 0.6 % (ref 0–1.5)
BILIRUB SERPL-MCNC: 0.4 MG/DL (ref 0–1.2)
BUN SERPL-MCNC: 12 MG/DL (ref 6–20)
BUN/CREAT SERPL: 11.4 (ref 7–25)
CALCIUM SPEC-SCNC: 9.3 MG/DL (ref 8.6–10.5)
CHLORIDE SERPL-SCNC: 101 MMOL/L (ref 98–107)
CO2 SERPL-SCNC: 23.9 MMOL/L (ref 22–29)
CREAT SERPL-MCNC: 1.05 MG/DL (ref 0.76–1.27)
DEPRECATED RDW RBC AUTO: 39.3 FL (ref 37–54)
EOSINOPHIL # BLD AUTO: 0.56 10*3/MM3 (ref 0–0.4)
EOSINOPHIL NFR BLD AUTO: 6.7 % (ref 0.3–6.2)
ERYTHROCYTE [DISTWIDTH] IN BLOOD BY AUTOMATED COUNT: 13.1 % (ref 12.3–15.4)
GFR SERPL CREATININE-BSD FRML MDRD: 73 ML/MIN/1.73
GLOBULIN UR ELPH-MCNC: 3 GM/DL
GLUCOSE SERPL-MCNC: 105 MG/DL (ref 65–99)
HCT VFR BLD AUTO: 43.2 % (ref 37.5–51)
HGB BLD-MCNC: 14.8 G/DL (ref 13–17.7)
HOLD SPECIMEN: NORMAL
HOLD SPECIMEN: NORMAL
IMM GRANULOCYTES # BLD AUTO: 0.02 10*3/MM3 (ref 0–0.05)
IMM GRANULOCYTES NFR BLD AUTO: 0.2 % (ref 0–0.5)
LYMPHOCYTES # BLD AUTO: 1.88 10*3/MM3 (ref 0.7–3.1)
LYMPHOCYTES NFR BLD AUTO: 22.6 % (ref 19.6–45.3)
MCH RBC QN AUTO: 28.6 PG (ref 26.6–33)
MCHC RBC AUTO-ENTMCNC: 34.3 G/DL (ref 31.5–35.7)
MCV RBC AUTO: 83.4 FL (ref 79–97)
MONOCYTES # BLD AUTO: 0.54 10*3/MM3 (ref 0.1–0.9)
MONOCYTES NFR BLD AUTO: 6.5 % (ref 5–12)
NEUTROPHILS NFR BLD AUTO: 5.27 10*3/MM3 (ref 1.7–7)
NEUTROPHILS NFR BLD AUTO: 63.4 % (ref 42.7–76)
NRBC BLD AUTO-RTO: 0 /100 WBC (ref 0–0.2)
PLATELET # BLD AUTO: 266 10*3/MM3 (ref 140–450)
PMV BLD AUTO: 8.9 FL (ref 6–12)
POTASSIUM SERPL-SCNC: 3.7 MMOL/L (ref 3.5–5.2)
PROT SERPL-MCNC: 7.1 G/DL (ref 6–8.5)
RBC # BLD AUTO: 5.18 10*6/MM3 (ref 4.14–5.8)
SODIUM SERPL-SCNC: 137 MMOL/L (ref 136–145)
TROPONIN T SERPL-MCNC: <0.01 NG/ML (ref 0–0.03)
TROPONIN T SERPL-MCNC: <0.01 NG/ML (ref 0–0.03)
WBC # BLD AUTO: 8.32 10*3/MM3 (ref 3.4–10.8)
WHOLE BLOOD HOLD SPECIMEN: NORMAL
WHOLE BLOOD HOLD SPECIMEN: NORMAL

## 2020-08-26 PROCEDURE — 93005 ELECTROCARDIOGRAM TRACING: CPT | Performed by: PHYSICIAN ASSISTANT

## 2020-08-26 PROCEDURE — 99284 EMERGENCY DEPT VISIT MOD MDM: CPT

## 2020-08-26 PROCEDURE — 85025 COMPLETE CBC W/AUTO DIFF WBC: CPT | Performed by: PHYSICIAN ASSISTANT

## 2020-08-26 PROCEDURE — 84484 ASSAY OF TROPONIN QUANT: CPT | Performed by: PHYSICIAN ASSISTANT

## 2020-08-26 PROCEDURE — 80053 COMPREHEN METABOLIC PANEL: CPT | Performed by: PHYSICIAN ASSISTANT

## 2020-08-26 PROCEDURE — 71045 X-RAY EXAM CHEST 1 VIEW: CPT

## 2020-08-26 RX ORDER — HYDRALAZINE HYDROCHLORIDE 20 MG/ML
20 INJECTION INTRAMUSCULAR; INTRAVENOUS ONCE
Status: DISCONTINUED | OUTPATIENT
Start: 2020-08-26 | End: 2020-08-26

## 2020-08-26 RX ORDER — CLONIDINE HYDROCHLORIDE 0.1 MG/1
0.1 TABLET ORAL ONCE
Status: COMPLETED | OUTPATIENT
Start: 2020-08-26 | End: 2020-08-26

## 2020-08-26 RX ADMIN — CLONIDINE HYDROCHLORIDE 0.1 MG: 0.1 TABLET ORAL at 15:34

## 2020-08-26 NOTE — DISCHARGE INSTRUCTIONS
Your blood pressure was elevated today and you should follow-up with your primary care provider for further evaluation.  Return to the emergency department with any new or worsening symptoms.

## 2020-08-26 NOTE — ED PROVIDER NOTES
"Subjective   Patient is a 55-year-old female with a history of high blood pressure, hyperlipidemia, and depression and anxiety who presents to the emergency department for chest pain that began at approximately 4 AM.  He lives in assisted living and was recently told by the director there that he would be evicted soon.  He is a had extreme anxiety over this and woke up feeling anxious with substernal chest pain that did not radiate.  He did have some associated nausea and diaphoresis.  Since that time, the chest pain has been intermittent, not associated with exertion or deep breaths.  He reports that he has a history of \"a small MI\" but admits that he has never undergone cardiac catheterization.  He denies any history of DVT or PE, stasis, calf pain or swelling, cough or hemoptysis, recent travel.  He presents to the emergency department for both evaluation of his chest pain and assistance with his living situation.          Review of Systems   Constitutional: Negative.    HENT: Negative.    Eyes: Negative.    Respiratory: Negative.    Cardiovascular: Positive for chest pain.   Gastrointestinal: Negative.    Endocrine: Negative.    Genitourinary: Negative.    Musculoskeletal: Negative.    Skin: Negative.    Allergic/Immunologic: Negative.    Neurological: Negative.    Hematological: Negative.    Psychiatric/Behavioral: The patient is nervous/anxious.        Past Medical History:   Diagnosis Date   • Anxiety    • Back pain    • Depression    • Depression with anxiety    • Diabetes mellitus (CMS/[facility])    • Fracture, humerus 03/25/2017    Right    • GERD (gastroesophageal reflux disease)    • Hyperlipidemia    • Hypertension    • Hypothyroidism    • Leg pain    • Prostate enlargement    • PVD (peripheral vascular disease) (CMS/[facility])        No Known Allergies    Past Surgical History:   Procedure Laterality Date   • ABDOMINAL SURGERY      Exploratory   • CHOLECYSTECTOMY     • FEMORAL POPLITEAL BYPASS Left     04/19/2016 "   • HERNIA REPAIR      x2       Family History   Problem Relation Age of Onset   • Coronary artery disease Mother    • Depression Mother    • Osteoporosis Mother    • Hypertension Mother    • Stroke Father    • Cerebral aneurysm Father    • Diverticulitis Father        Social History     Socioeconomic History   • Marital status:      Spouse name: Not on file   • Number of children: Not on file   • Years of education: Not on file   • Highest education level: Not on file   Tobacco Use   • Smoking status: Current Every Day Smoker     Packs/day: 0.50     Years: 20.00     Pack years: 10.00     Types: Cigarettes   • Tobacco comment: Smoking 4 cigarettes a day   Substance and Sexual Activity   • Alcohol use: No   • Drug use: Yes     Comment: Previously    • Sexual activity: Defer           Objective   Physical Exam   Constitutional: He is oriented to person, place, and time. He appears well-developed and well-nourished.   HENT:   Head: Normocephalic.   Eyes: Pupils are equal, round, and reactive to light.   Neck: Neck supple.   Cardiovascular: Normal rate, regular rhythm and normal pulses. Exam reveals distant heart sounds.   Pulmonary/Chest: Effort normal and breath sounds normal.   Abdominal: Soft. Bowel sounds are normal.   Ventral incision well-healed   Musculoskeletal:        Right lower leg: He exhibits no tenderness and no edema.        Left lower leg: He exhibits no tenderness and no edema.   Neurological: He is alert and oriented to person, place, and time.   Skin: Skin is warm and dry. Capillary refill takes less than 2 seconds.   Psychiatric: His mood appears anxious.       Procedures           ED Course  ED Course as of Aug 26 1554   Wed Aug 26, 2020   1215 D-dimer, Quantitative [KV]   1537 EKG interpreted by me reveals sinus rhythm with a rate of 78 bpm.  There are nonspecific T wave changes that are similar in appearance to patient's previous EKG.  This is an abnormal appearing EKG.    [TB]      ED  Course User Index  [KV] Yaima Parrish PA  [TB] Amanda Reyes MD                                           MDM  Number of Diagnoses or Management Options  Diagnosis management comments: Patient is a 55-year-old male who presents to the emergency department with chest pain that began at 4 AM.  On arrival, he is hypertensive with a systolic of 180 but otherwise hemodynamically stable, afebrile.  Differential considered included anxiety, ACS, musculoskeletal chest wall pain, GERD.  Low suspicion for HTN emergency given that patient states his pressure is always that high. However, will continue to monitor. Work-up included a CBC, CMP, troponin, EKG, and chest x-ray.   consulted for assistance with his living situation.  EKG showed some nonspecific T wave changes that were similar to prior EKGs.  Otherwise it was nonischemic and he had a negative delta troponin.  Heart score is 4.  On reevaluation, patient states he has no more chest pain and just needs help finding a place to live. He can follow-up as an outpatient for further evaluation.  All other labs within normal limits are nonactionable.  Chest x-ray had no acute findings.  Patient is well-appearing and appropriate for outpatient management.   assisted him at the bedside.  On final evaluation, patient's blood pressure had risen to 210 systolic but he denied ongoing chest pain at this time.  He was given his home clonidine with improvement in his systolic back to 180.  I encouraged the patient to follow-up with his primary care provider for better blood pressure control.  He was given return precautions and verbalized understanding and agreement.       Amount and/or Complexity of Data Reviewed  Clinical lab tests: reviewed  Tests in the radiology section of CPT®: reviewed    Risk of Complications, Morbidity, and/or Mortality  Presenting problems: moderate  Diagnostic procedures: moderate  Management options:  moderate        Final diagnoses:   Atypical chest pain            Yaima Parrish PA  08/26/20 0816

## 2020-08-26 NOTE — CONSULTS
Continued Stay Note  [facility]     Patient Name: Guero Casey  MRN: [id number]  Today's Date: 8/26/2020    Admit Date: 8/26/2020    Discharge Plan     Row Name 08/26/20 1416       Plan    Plan Social Service consult for homeless      Plan Comments SW met with pt in ED due to consult. Pt informs SW that he was living in [facility] (a low-income/HUD apartment) and he was evicted. After investigating, SW learns that pt did receive a notice 30 days ago that he would be evicted due to having too many people living in the home. He comes in to the ED now that he has been evicted.     Pt does qualify for [facility], called [employer] and they have some apartments opening up. SW discussed options of transferring to a homeless shelter in [place], he wants to stay in [place]. States he has a friend that drives him to the bank the beginning of every month and wants to stay around here until at least Monday. Pt states he gets paid on Monday and will have money for a motel if needed. Pt states he has a walker and a few belongings at a friends apartment next door to where he was at, does not use walker all the time, just as needed. Pt would like to go get those items and then go to the [facility] here in [place]. SW did make pt aware that this homeless shelter is only open during the night, not during the day. He is ok with this, states he can use the bus and go to friends if needed during the day. SW asked about staying with friends, he states that is not an option, he is not allowed to elizabeth to having too many people in their apartment, they could get in trouble.     CONSUELO did give pt something to eat with RN permission, PCT checked. Pt was given a meal and [employer] application to complete. SW called to [employer] at [facility] [employer], they do have open apartments. Will fax application for pt and have him sign LB to ([phone]). He does not have transportation and is unable to take it himself. SW will get pt a ride to  his apartment to get walker and get him over to the [facility]. Will give him bus tokens for the next few days until he gets paid. Updated JESSIE Amezcua. In agreement with plan.     15:40  CONSUELO assisted pt with completing application for [facility]. Permission given to send application to [facility] at [facility]. [employer] updated and looking out for fax. Pt states he does not have a personal cell-phone, but he can call from friends phones to check on status of application. SW also provided pt with [facility]'s number to call.     Pt states he wants to go back to his apartment and get his things, and then [facility]. CONSUELO provided pt with cab voucher to get from his apartment to the [facility], will call [employer] when d/c from the ED. CONSUELO provided pt with [facility] and local homeless shelters in [facility], and [place]. He was appreciative of assistance and is in agreement with d/c plan. Updated JESSIE Amezcua and ZENON Real to call SW when pt is completely ready to d/c.     16:12  ZENON Real called, pt is completely d/c and ready to go. CONSUELO called [facility] for transport. They contact [employer] and they are sending a  right over. No further needs at this time.         Discharge Codes    No documentation.             CLEMENT Brewer   14:40  08/26/20

## 2020-08-30 ENCOUNTER — HOSPITAL ENCOUNTER (EMERGENCY)
Facility: HOSPITAL | Age: 55
Discharge: HOME | End: 2020-08-30
Attending: EMERGENCY MEDICINE | Admitting: EMERGENCY MEDICINE

## 2020-08-30 ENCOUNTER — APPOINTMENT (OUTPATIENT)
Dept: GENERAL RADIOLOGY | Facility: HOSPITAL | Age: 55
End: 2020-08-30

## 2020-08-30 VITALS
BODY MASS INDEX: 37.1 KG/M2 | OXYGEN SATURATION: 97 % | SYSTOLIC BLOOD PRESSURE: 169 MMHG | HEIGHT: 71 IN | HEART RATE: 77 BPM | WEIGHT: 265 LBS | RESPIRATION RATE: 18 BRPM | DIASTOLIC BLOOD PRESSURE: 87 MMHG | TEMPERATURE: 98 F

## 2020-08-30 DIAGNOSIS — M79.671 PAIN IN BOTH FEET: Primary | ICD-10-CM

## 2020-08-30 DIAGNOSIS — M79.672 PAIN IN BOTH FEET: Primary | ICD-10-CM

## 2020-08-30 DIAGNOSIS — R07.9 CHEST PAIN, UNSPECIFIED TYPE: ICD-10-CM

## 2020-08-30 LAB
ALBUMIN SERPL-MCNC: 4 G/DL (ref 3.5–5.2)
ALBUMIN/GLOB SERPL: 1.4 G/DL
ALP SERPL-CCNC: 63 U/L (ref 39–117)
ALT SERPL W P-5'-P-CCNC: 13 U/L (ref 1–41)
ANION GAP SERPL CALCULATED.3IONS-SCNC: 13.2 MMOL/L (ref 5–15)
AST SERPL-CCNC: 17 U/L (ref 1–40)
BASOPHILS # BLD AUTO: 0.05 10*3/MM3 (ref 0–0.2)
BASOPHILS NFR BLD AUTO: 0.6 % (ref 0–1.5)
BILIRUB SERPL-MCNC: 0.5 MG/DL (ref 0–1.2)
BUN SERPL-MCNC: 13 MG/DL (ref 6–20)
BUN/CREAT SERPL: 11.9 (ref 7–25)
CALCIUM SPEC-SCNC: 9.2 MG/DL (ref 8.6–10.5)
CHLORIDE SERPL-SCNC: 105 MMOL/L (ref 98–107)
CO2 SERPL-SCNC: 22.8 MMOL/L (ref 22–29)
CREAT SERPL-MCNC: 1.09 MG/DL (ref 0.76–1.27)
DEPRECATED RDW RBC AUTO: 40.8 FL (ref 37–54)
EOSINOPHIL # BLD AUTO: 0.63 10*3/MM3 (ref 0–0.4)
EOSINOPHIL NFR BLD AUTO: 7.4 % (ref 0.3–6.2)
ERYTHROCYTE [DISTWIDTH] IN BLOOD BY AUTOMATED COUNT: 13.2 % (ref 12.3–15.4)
GFR SERPL CREATININE-BSD FRML MDRD: 70 ML/MIN/1.73
GLOBULIN UR ELPH-MCNC: 2.8 GM/DL
GLUCOSE SERPL-MCNC: 98 MG/DL (ref 65–99)
HCT VFR BLD AUTO: 40.6 % (ref 37.5–51)
HGB BLD-MCNC: 13.9 G/DL (ref 13–17.7)
HOLD SPECIMEN: NORMAL
HOLD SPECIMEN: NORMAL
IMM GRANULOCYTES # BLD AUTO: 0.03 10*3/MM3 (ref 0–0.05)
IMM GRANULOCYTES NFR BLD AUTO: 0.4 % (ref 0–0.5)
LYMPHOCYTES # BLD AUTO: 1.95 10*3/MM3 (ref 0.7–3.1)
LYMPHOCYTES NFR BLD AUTO: 22.8 % (ref 19.6–45.3)
MCH RBC QN AUTO: 29 PG (ref 26.6–33)
MCHC RBC AUTO-ENTMCNC: 34.2 G/DL (ref 31.5–35.7)
MCV RBC AUTO: 84.6 FL (ref 79–97)
MONOCYTES # BLD AUTO: 0.58 10*3/MM3 (ref 0.1–0.9)
MONOCYTES NFR BLD AUTO: 6.8 % (ref 5–12)
NEUTROPHILS NFR BLD AUTO: 5.31 10*3/MM3 (ref 1.7–7)
NEUTROPHILS NFR BLD AUTO: 62 % (ref 42.7–76)
NRBC BLD AUTO-RTO: 0 /100 WBC (ref 0–0.2)
PLATELET # BLD AUTO: 243 10*3/MM3 (ref 140–450)
PMV BLD AUTO: 9.1 FL (ref 6–12)
POTASSIUM SERPL-SCNC: 3.3 MMOL/L (ref 3.5–5.2)
PROT SERPL-MCNC: 6.8 G/DL (ref 6–8.5)
RBC # BLD AUTO: 4.8 10*6/MM3 (ref 4.14–5.8)
SODIUM SERPL-SCNC: 141 MMOL/L (ref 136–145)
TROPONIN I SERPL-MCNC: 0.01 NG/ML (ref 0–0.05)
TROPONIN T SERPL-MCNC: <0.01 NG/ML (ref 0–0.03)
TROPONIN T SERPL-MCNC: <0.01 NG/ML (ref 0–0.03)
WBC # BLD AUTO: 8.55 10*3/MM3 (ref 3.4–10.8)
WHOLE BLOOD HOLD SPECIMEN: NORMAL
WHOLE BLOOD HOLD SPECIMEN: NORMAL

## 2020-08-30 PROCEDURE — 85025 COMPLETE CBC W/AUTO DIFF WBC: CPT

## 2020-08-30 PROCEDURE — 84484 ASSAY OF TROPONIN QUANT: CPT | Performed by: EMERGENCY MEDICINE

## 2020-08-30 PROCEDURE — 80053 COMPREHEN METABOLIC PANEL: CPT

## 2020-08-30 PROCEDURE — 93005 ELECTROCARDIOGRAM TRACING: CPT

## 2020-08-30 PROCEDURE — 99284 EMERGENCY DEPT VISIT MOD MDM: CPT

## 2020-08-30 PROCEDURE — 71045 X-RAY EXAM CHEST 1 VIEW: CPT

## 2020-08-30 PROCEDURE — 84484 ASSAY OF TROPONIN QUANT: CPT

## 2020-08-30 RX ORDER — AMLODIPINE BESYLATE 10 MG/1
10 TABLET ORAL DAILY
Qty: 30 TABLET | Refills: 0 | Status: SHIPPED | OUTPATIENT
Start: 2020-08-30

## 2020-08-30 RX ORDER — CLONIDINE HYDROCHLORIDE 0.1 MG/1
0.1 TABLET ORAL 2 TIMES DAILY
Qty: 60 TABLET | Refills: 0 | Status: SHIPPED | OUTPATIENT
Start: 2020-08-30

## 2020-08-30 RX ORDER — CITALOPRAM 40 MG/1
40 TABLET ORAL EVERY MORNING
Qty: 30 TABLET | Refills: 0 | Status: SHIPPED | OUTPATIENT
Start: 2020-08-30

## 2020-08-30 RX ORDER — LANSOPRAZOLE 15 MG/1
15 CAPSULE, DELAYED RELEASE ORAL DAILY
Qty: 30 CAPSULE | Refills: 0 | Status: SHIPPED | OUTPATIENT
Start: 2020-08-30

## 2020-08-30 RX ORDER — LOSARTAN POTASSIUM AND HYDROCHLOROTHIAZIDE 25; 100 MG/1; MG/1
1 TABLET ORAL DAILY
Qty: 30 TABLET | Refills: 0 | Status: SHIPPED | OUTPATIENT
Start: 2020-08-30

## 2020-08-30 RX ORDER — LEVOTHYROXINE SODIUM 0.2 MG/1
200 TABLET ORAL DAILY
Qty: 30 TABLET | Refills: 0 | Status: SHIPPED | OUTPATIENT
Start: 2020-08-30

## 2020-08-30 RX ORDER — ASPIRIN 81 MG/1
81 TABLET ORAL DAILY
Qty: 30 TABLET | Refills: 0 | Status: SHIPPED | OUTPATIENT
Start: 2020-08-30

## 2020-08-30 RX ORDER — SODIUM CHLORIDE 0.9 % (FLUSH) 0.9 %
10 SYRINGE (ML) INJECTION AS NEEDED
Status: DISCONTINUED | OUTPATIENT
Start: 2020-08-30 | End: 2020-08-30 | Stop reason: HOSPADM

## 2020-08-30 RX ORDER — ALBUTEROL SULFATE 90 UG/1
2 AEROSOL, METERED RESPIRATORY (INHALATION) EVERY 4 HOURS PRN
Qty: 18 G | Refills: 0 | Status: SHIPPED | OUTPATIENT
Start: 2020-08-30

## 2020-09-20 ENCOUNTER — HOSPITAL ENCOUNTER (EMERGENCY)
Facility: HOSPITAL | Age: 55
Discharge: HOME | End: 2020-09-21
Attending: EMERGENCY MEDICINE | Admitting: EMERGENCY MEDICINE

## 2020-09-20 ENCOUNTER — APPOINTMENT (OUTPATIENT)
Dept: CT IMAGING | Facility: HOSPITAL | Age: 55
End: 2020-09-20

## 2020-09-20 ENCOUNTER — HOSPITAL ENCOUNTER (EMERGENCY)
Facility: HOSPITAL | Age: 55
Discharge: HOME | End: 2020-09-20
Attending: EMERGENCY MEDICINE | Admitting: EMERGENCY MEDICINE

## 2020-09-20 VITALS
OXYGEN SATURATION: 95 % | TEMPERATURE: 97.5 F | HEIGHT: 71 IN | DIASTOLIC BLOOD PRESSURE: 99 MMHG | SYSTOLIC BLOOD PRESSURE: 156 MMHG | RESPIRATION RATE: 20 BRPM | HEART RATE: 68 BPM | WEIGHT: 265 LBS | BODY MASS INDEX: 37.1 KG/M2

## 2020-09-20 DIAGNOSIS — Z59.00 HOMELESSNESS: ICD-10-CM

## 2020-09-20 DIAGNOSIS — R53.83 OTHER FATIGUE: Primary | ICD-10-CM

## 2020-09-20 DIAGNOSIS — F19.91 HISTORY OF DRUG USE: ICD-10-CM

## 2020-09-20 DIAGNOSIS — Z59.00 HOMELESS: ICD-10-CM

## 2020-09-20 DIAGNOSIS — Z86.39 HISTORY OF HYPOTHYROIDISM: ICD-10-CM

## 2020-09-20 DIAGNOSIS — F41.9 ANXIETY AND DEPRESSION: ICD-10-CM

## 2020-09-20 DIAGNOSIS — F32.A ANXIETY AND DEPRESSION: ICD-10-CM

## 2020-09-20 DIAGNOSIS — R53.83 MALAISE AND FATIGUE: Primary | ICD-10-CM

## 2020-09-20 DIAGNOSIS — Z91.81 PERSONAL HISTORY OF FALL: ICD-10-CM

## 2020-09-20 DIAGNOSIS — R53.1 GENERALIZED WEAKNESS: ICD-10-CM

## 2020-09-20 DIAGNOSIS — Z86.79 HISTORY OF HYPERTENSION: ICD-10-CM

## 2020-09-20 DIAGNOSIS — R53.81 MALAISE AND FATIGUE: Primary | ICD-10-CM

## 2020-09-20 LAB
ALBUMIN SERPL-MCNC: 4.3 G/DL (ref 3.5–5.2)
ALBUMIN/GLOB SERPL: 1.4 G/DL
ALP SERPL-CCNC: 88 U/L (ref 39–117)
ALT SERPL W P-5'-P-CCNC: 20 U/L (ref 1–41)
ANION GAP SERPL CALCULATED.3IONS-SCNC: 10.8 MMOL/L (ref 5–15)
AST SERPL-CCNC: 18 U/L (ref 1–40)
BASOPHILS # BLD AUTO: 0.05 10*3/MM3 (ref 0–0.2)
BASOPHILS NFR BLD AUTO: 0.5 % (ref 0–1.5)
BILIRUB SERPL-MCNC: 0.4 MG/DL (ref 0–1.2)
BUN SERPL-MCNC: 12 MG/DL (ref 6–20)
BUN/CREAT SERPL: 10.6 (ref 7–25)
CALCIUM SPEC-SCNC: 9.4 MG/DL (ref 8.6–10.5)
CHLORIDE SERPL-SCNC: 101 MMOL/L (ref 98–107)
CO2 SERPL-SCNC: 27.2 MMOL/L (ref 22–29)
CREAT SERPL-MCNC: 1.13 MG/DL (ref 0.76–1.27)
DEPRECATED RDW RBC AUTO: 40.1 FL (ref 37–54)
EOSINOPHIL # BLD AUTO: 0.4 10*3/MM3 (ref 0–0.4)
EOSINOPHIL NFR BLD AUTO: 4.1 % (ref 0.3–6.2)
ERYTHROCYTE [DISTWIDTH] IN BLOOD BY AUTOMATED COUNT: 13 % (ref 12.3–15.4)
GFR SERPL CREATININE-BSD FRML MDRD: 67 ML/MIN/1.73
GLOBULIN UR ELPH-MCNC: 3 GM/DL
GLUCOSE SERPL-MCNC: 116 MG/DL (ref 65–99)
HCT VFR BLD AUTO: 45 % (ref 37.5–51)
HGB BLD-MCNC: 15.5 G/DL (ref 13–17.7)
IMM GRANULOCYTES # BLD AUTO: 0.03 10*3/MM3 (ref 0–0.05)
IMM GRANULOCYTES NFR BLD AUTO: 0.3 % (ref 0–0.5)
LYMPHOCYTES # BLD AUTO: 1.64 10*3/MM3 (ref 0.7–3.1)
LYMPHOCYTES NFR BLD AUTO: 16.9 % (ref 19.6–45.3)
MCH RBC QN AUTO: 29.2 PG (ref 26.6–33)
MCHC RBC AUTO-ENTMCNC: 34.4 G/DL (ref 31.5–35.7)
MCV RBC AUTO: 84.9 FL (ref 79–97)
MONOCYTES # BLD AUTO: 0.69 10*3/MM3 (ref 0.1–0.9)
MONOCYTES NFR BLD AUTO: 7.1 % (ref 5–12)
NEUTROPHILS NFR BLD AUTO: 6.9 10*3/MM3 (ref 1.7–7)
NEUTROPHILS NFR BLD AUTO: 71.1 % (ref 42.7–76)
NRBC BLD AUTO-RTO: 0 /100 WBC (ref 0–0.2)
PLATELET # BLD AUTO: 269 10*3/MM3 (ref 140–450)
PMV BLD AUTO: 9 FL (ref 6–12)
POTASSIUM SERPL-SCNC: 3.6 MMOL/L (ref 3.5–5.2)
PROT SERPL-MCNC: 7.3 G/DL (ref 6–8.5)
RBC # BLD AUTO: 5.3 10*6/MM3 (ref 4.14–5.8)
SODIUM SERPL-SCNC: 139 MMOL/L (ref 136–145)
WBC # BLD AUTO: 9.71 10*3/MM3 (ref 3.4–10.8)

## 2020-09-20 PROCEDURE — 99283 EMERGENCY DEPT VISIT LOW MDM: CPT

## 2020-09-20 PROCEDURE — 80053 COMPREHEN METABOLIC PANEL: CPT | Performed by: EMERGENCY MEDICINE

## 2020-09-20 PROCEDURE — 99284 EMERGENCY DEPT VISIT MOD MDM: CPT

## 2020-09-20 PROCEDURE — 93005 ELECTROCARDIOGRAM TRACING: CPT | Performed by: EMERGENCY MEDICINE

## 2020-09-20 PROCEDURE — 85025 COMPLETE CBC W/AUTO DIFF WBC: CPT | Performed by: EMERGENCY MEDICINE

## 2020-09-20 PROCEDURE — 93005 ELECTROCARDIOGRAM TRACING: CPT | Performed by: PHYSICIAN ASSISTANT

## 2020-09-20 PROCEDURE — 70450 CT HEAD/BRAIN W/O DYE: CPT

## 2020-09-20 RX ORDER — SODIUM CHLORIDE 0.9 % (FLUSH) 0.9 %
10 SYRINGE (ML) INJECTION AS NEEDED
Status: DISCONTINUED | OUTPATIENT
Start: 2020-09-20 | End: 2020-09-20 | Stop reason: HOSPADM

## 2020-09-20 RX ADMIN — SODIUM CHLORIDE 1000 ML: 9 INJECTION, SOLUTION INTRAVENOUS at 10:37

## 2020-09-20 SDOH — ECONOMIC STABILITY - HOUSING INSECURITY: HOMELESSNESS UNSPECIFIED: Z59.00
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
## 2020-09-20 NOTE — PROGRESS NOTES
Continued Stay Note  [facility]     Patient Name: Guero Casey  MRN: [id number]  Today's Date: 9/20/2020    Admit Date: 9/20/2020    Discharge Plan     Row Name 09/20/20 1946       Plan    Plan  Consult due to homelessness. Spoke to patient who stated he had no family and no where to go. States he is staying here and there. Not eating regularly, given meal in ED. Claims he cannot go to [facility] in [place]. Agrees to go to [facility] in [place]. by taxi. Notified nursing. Later called by [facility] stating they only take intakes M-F 8-4 due to Covid which is an unknown procedure to . Called 8 shelters there is no bed for patient as he has used his 6 month time and left before getting his check and housing established. Notified [facility]. He is asked to leave their property until intake is present at 8 am in the morning. Notified house and ED charge nurse.   [facility] is where he has used his time for 6 months and cannot return until then.     Discharge Codes    No documentation.             Nery Greenfield, CHIN

## 2020-09-20 NOTE — ED NOTES
Behavioral Health called at this time with no answer. Voicemail left.     Vandana Mendoza  09/20/20 7847

## 2020-09-20 NOTE — CONSULTS
"Guero Casey  1965    TIME: 145-215    Is patient agreeable to admission/treatment? N/A    Guardian: self    Pt Lives With:  Patient states he was evicted from his apartment 2 weeks ago and has been homeless since.    Presenting Problems: (How is the patient a danger to self or others?) Patient brought to ED via EMS this morning for generalized weakness. While in ED patient endorses depressive symptoms to ED staff including hopelessness, mood swings, poor sleep, sadness, fleeting SI. Patient reports to therapist his father  in 2020 and now he has no family members/support left. Patient also reports he is now homeless and is feeling hopeless. Patient states his mood \"bounces around a lot.\" Patient endorses fleeting SI but denies intent, plan or preparatory behaviors.     Current Stressors: homeless, no support, grief, substance use    Depression: 9     Anxiety: 7    Previous Psychiatric Treatment: Yes    If yes, describe: Patient reports history of inpatient and outpatient treatment. Records indicate patient's last hospitalization was 8 years ago at [facility] after suicide attempt. Patient states he has also been hospitalized in [place], [place]. Patient is a poor historian regarding hospitalization history. Patient states he was receiving outpatient therapy at [facility] with Payal but he stopped attending after the death of his father. Patient denies receiving medication management.     Last inpatient admission: Records indicate patient's last hospitalization was 8 years ago at [facility] after suicide attempt.    Number of admissions: 3    Last outpatient visit: 2020    Suicidal: Patient endorses fleeting, non-specific thoughts. He states, \"I don't think that'll do no good. I don't have a plan or anything.\"     Previous Attempts: 3  prior suicide attempts    Number of Previous Attempts: 3    Most Recent Attempt: 8 years ago    [place]UMBIA-SUICIDE SEVERITY RATING " [facility]  Psychiatric Inpatient Setting - Discharge Screener    Ask questions that are bold and underlined Discharge   Ask Questions 1 and 2 YES NO   1) Wish to be Dead:   Person endorses thoughts about a wish to be dead or not alive anymore, or wish to fall asleep and not wake up.  While you were here in the hospital, have you wished you were dead or wished you could go to sleep and not wake up? X    2) Suicidal Thoughts:   General non-specific thoughts of wanting to end one's life/die by suicide, “I've thought about killing myself” without general thoughts of ways to kill oneself/associated methods, intent, or plan.   While you were here in the hospital, have you actually had thoughts about killing yourself?  X    If YES to 2, ask questions 3, 4, 5, and 6.  If NO to 2, go directly to question 6   3) Suicidal Thoughts with Method (without Specific Plan or Intent to Act):   Person endorses thoughts of suicide and has thought of a least one method during the assessment period. This is different than a specific plan with time, place or method details worked out. “I thought about taking an overdose but I never made a specific plan as to when where or how I would actually do it….and I would never go through with it.”   Have you been thinking about how you might kill yourself?   X   4) Suicidal Intent (without Specific Plan):   Active suicidal thoughts of killing oneself and patient reports having some intent to act on such thoughts, as opposed to “I have the thoughts but I definitely will not do anything about them.”   Have you had these thoughts and had some intention of acting on them or do you have some intention of acting on them after you leave the hospital?   X   5) Suicide Intent with Specific Plan:   Thoughts of killing oneself with details of plan fully or partially worked out and person has some intent to carry it out.   Have you started to work out or worked out the details of how to kill yourself either for  "while you were here in the hospital or for after you leave the hospital? Do you intend to carry out this plan?   X     6) Suicide Behavior    While you were here in the hospital, have you done anything, started to do anything, or prepared to do anything to end your life?    Examples: Took pills, cut yourself, tried to hang yourself, took out pills but didn't swallow any because you changed your mind or someone took them from you, collected pills, secured a means of obtaining a gun, gave away valuables, wrote a will or suicide note, etc.  X       Delusions: None. Patient presents with linear thought process.      Hallucinations: Patient states for the last month he has been seeing black people and hearing whispers. Patient reports these images and sounds \"come and go.\" Patient does not appear to be responding to internal stimuli during assessment.     Mood: sad     Homicidal Ideations: Absent     Abuse History: Patient denies.     Does this require reporting: No    Legal History / History of Violence: The patient has no current pending legal charges.     Sleep: Poor    Appetite: Good    Current Medical Conditions: Yes    If yes, explain: Hypertension, chronic leg pain, and depression     Current Psychiatric Medications: Patient reports he is not prescribed any psychiatric medications.      History of Inappropriate Sexual Behavior: No    Hopelessness: Mild    Orientation: alert and oriented to person, place, and time     Substance Abuse: Patient reports using meth 1x per month. Patient states he used a quarter of a gram (meth) 3 weeks ago.     COWS: N/A    CIWA: N/A    Withdrawal Symptoms: N/A     History of DT's: No    History of Seizures: No    SUBSTANCE ABUSE HISTORY:      DRUG   PRESENT USE  Y/N   AGE @ 1ST USE    ROUTE   HOW MUCH   HOW OFTEN   HOW LONG AT THIS RATE   Date of last use/  Amount used   Nicotine            Alcohol            Marijuana            Benzos              Neurontin            Methadone      " "      Opiates              Cocaine             Heroin            Meth   Yes   1/4 of gram 1x monthly  3 weeks ago   Suboxone                  DATA:   This therapist received a call from [facility] staff JESSIE Minor with orders from MD Gonzalez for a behavioral health consult.  The patient serves as his own guardian and is agreeable to speak with me.  Met with patient at bedside. Patient is not under 1:1 security monitoring during assessment.  Patient is a 55 year old, , , male residing in [place], [place]. Patient currently homeless.  Patient is unemployed and receives SSI. Patient brought to ED via EMS this morning for generalized weakness. While in ED patient endorses depressive symptoms to ED staff including hopelessness, mood swings, poor sleep, sadness, and fleeting SI. Patient reports to therapist his father  in 2020 and now he is alone as all family members are . Patient also reports he has been homeless for 2 weeks and is feeling hopeless. Patient states, \"I'm not very healthy myself and now all this is starting to get to me.\" Patient states he was evicted from apartment for having too many people over however he reports these were people trying to help him keep his apartment clean. Patient states he has been staying at [facility]. Patient has been hospitalized before however he as difficulty providing details. Records indicate patient was hospitalized 8 years ago at [facility]; patient confirms this. Patient was engaged in outpatient therapy at [facility] with Payal but he states he stopped going after the death of his father. Patient reports he is not prescribed any psychiatric medications. Patient endorses fleeting, nonspecific suicidal thoughts but denies intent, plan and preparatory behaviors. Patient states, \"It's tiresome to wake wear the same clothes everyday, have no one to talk to and be in the cold weather.\"       ASSESSMENT:    Therapist completed CSSRS " with patient for suicide risk assessment.  The results of patient’s CSSRS suggest that patient is endorsing fleeting, nonspecific suicidal thoughts but denies intent, plan and preparatory behaviors.  Patient holds attention and is Cooperative with assessment.  Patient’s appearance is disheveled, unkempt.  The patient displays Appropriate psychomotor behavior. The patient's affect appears mood-congruent and tearful. The patient is observed to have normal rate, tone and rhythm of speech.   Patient observed to have Good eye contact. The patient's displays poor insight, with fair impulse control and limited judgement.     PLAN:    At this time, therapist recommends case management consult and patient establish outpatient care again. Patient does not present with acute psychiatric symptoms requiring inpatient hospitalization and continues to deny intent, plan, and preparatory behaviors to harm himself or anyone else. Some of patient's emotions appear appropriate to context given recent eviction, homelessness and death of father. Per records, SW did attempt to assist patient with housing 1 month ago. SW offered to transfer patient to homeless shelter in [place], patient declined. Patient was also provided with [employer] application, SW faxed completed application and patient did not follow up. Patient states he has a couple bus passes left over to use for transportation. Therapist provided patient with outpatient resources, community resources, and emergency crisis lines. [facility] ED staff to contact case management for consult. Therapist informed patient of [facility] 24/7 emergency behavioral health services. Therapist informed [facility] ED staff MD Carlos Medina who are agreeable to plan.      Gilda Osman, [facility] 9/20/2020

## 2020-09-20 NOTE — ED NOTES
PT STATES HE IS HOMELESS AND WOULD LIKE ME TO CONTACT SOCIAL WORK.      Araceli Johnson RN  09/20/20 0901

## 2020-09-20 NOTE — ED PROVIDER NOTES
Subjective   55-year-old male presents to the ED with chief complaint of homelessness and weakness.  Patient states that he is homeless and has not had any of his medications for a few weeks at least.  Notes that his blood pressure is likely elevated because he has not had any of his antihypertensives.  He states that today he was walking up the street when he felt generally weak and like he could pass out.  He had no chest pain.  No loss of conscious.  No neck or back pain.  No nausea vomiting diarrhea or abdominal pain.  No recent illnesses.  No fever chills.  No cough shortness of breath or wheeze.  No other complaints at this time.          Review of Systems   Neurological: Positive for weakness.   All other systems reviewed and are negative.      Past Medical History:   Diagnosis Date   • Anxiety    • Back pain    • Depression    • Depression with anxiety    • Diabetes mellitus (CMS/formerly [facility])    • Fracture, humerus 03/25/2017    Right    • GERD (gastroesophageal reflux disease)    • Hyperlipidemia    • Hypertension    • Hypothyroidism    • Leg pain    • Prostate enlargement    • PVD (peripheral vascular disease) (CMS/formerly [facility])        No Known Allergies    Past Surgical History:   Procedure Laterality Date   • ABDOMINAL SURGERY      Exploratory   • CHOLECYSTECTOMY     • FEMORAL POPLITEAL BYPASS Left     04/19/2016   • HERNIA REPAIR      x2       Family History   Problem Relation Age of Onset   • Coronary artery disease Mother    • Depression Mother    • Osteoporosis Mother    • Hypertension Mother    • Stroke Father    • Cerebral aneurysm Father    • Diverticulitis Father        Social History     Socioeconomic History   • Marital status:      Spouse name: Not on file   • Number of children: Not on file   • Years of education: Not on file   • Highest education level: Not on file   Tobacco Use   • Smoking status: Current Every Day Smoker     Packs/day: 0.50     Years: 20.00     Pack years: 10.00     Types:  Cigarettes   • Tobacco comment: Smoking 4 cigarettes a day   Substance and Sexual Activity   • Alcohol use: No   • Drug use: Yes     Comment: Previously    • Sexual activity: Defer           Objective   Physical Exam  Vitals signs and nursing note reviewed.   Constitutional:       General: He is not in acute distress.     Appearance: He is well-developed. He is not diaphoretic.   HENT:      Head: Normocephalic and atraumatic.      Nose: Nose normal.   Eyes:      Conjunctiva/sclera: Conjunctivae normal.      Pupils: Pupils are equal, round, and reactive to light.   Cardiovascular:      Rate and Rhythm: Normal rate and regular rhythm.   Pulmonary:      Effort: Pulmonary effort is normal. No respiratory distress.      Breath sounds: Normal breath sounds.   Abdominal:      General: There is no distension.      Palpations: Abdomen is soft.      Tenderness: There is no abdominal tenderness.   Musculoskeletal:         General: No deformity.   Neurological:      Mental Status: He is alert and oriented to person, place, and time.      Cranial Nerves: No cranial nerve deficit.      Coordination: Coordination normal.         Procedures           ED Course                                           MDM  55-year-old male presents to the ED complaining of generalized fatigue and weakness.  Work-up was essentially unremarkable.  Laboratories also reassuring.  EKG nonischemic.  He was initially hypertensive but improved without treatment.  He has been without his medications for some time.  He then started complaining of feelings of hopelessness and depression.  He was seen and evaluated by behavioral health.  He was not acutely suicidal or homicidal.  They gave him some appropriate resources.  We then have the patient seen and evaluated by case management who were able to arrange for him to go to [facility] in [place] via cab.    Final diagnoses:   Other fatigue   Homelessness            Luke Gonzalez,   09/20/20  1730

## 2020-09-21 VITALS
SYSTOLIC BLOOD PRESSURE: 190 MMHG | RESPIRATION RATE: 20 BRPM | DIASTOLIC BLOOD PRESSURE: 90 MMHG | BODY MASS INDEX: 36.4 KG/M2 | TEMPERATURE: 98.2 F | HEIGHT: 71 IN | HEART RATE: 83 BPM | OXYGEN SATURATION: 97 % | WEIGHT: 260 LBS

## 2020-09-21 NOTE — ED PROVIDER NOTES
"Subjective   Guero Casey is a 55 y.o. male who presents to the ED with c/o leg pain and feeling like legs will \"give out\". The patient was seen earlier today at [facility] where he was complaining of generalized fatigue and weakness. He had a full work-up performed, with labs and an EKG, and the results were normal. The patient was given an IV fluid bolus, but afterwards he started complaining of hopelessness and depression. He was evaluated by behavioral health for these symptoms, denying suicidal ideation, and he had an arrangement to be transported to the [facility] in [place] via taxi cab. Once the patient was in [place] he had the cab redirected to [facility], where he is complaining of bilateral lower extremity weakness and pain. He reports that he has been falling a lot recently, 3 times within the past 3 days, and generally feels off balance. The patient states he wishes to enter rehab for meth usage, which he last used 1.5-2 weeks ago and he smoked it. His last rehab admission was in 2014.  Patient denies any symptoms of illness.  He denies any cough, congestion, URI symptoms.  He denies chest pain or shortness of breath.  He denies any abdominal pain or back pain.  He denies any dysuria, urgency, or frequency.  Patient denies any headache, near-syncope, or syncope.  Patient denies any unilateral weakness, numbness, or tingling.  He currently smokes approximately 0.5 packs of cigarettes daily. There are no other acute complaints at this time.      History provided by:  Patient  Leg Pain  Location:  Leg  Time since incident:  1 week  Injury: no    Leg location:  L leg and R leg  Pain details:     Quality:  Aching    Radiates to:  Does not radiate    Severity:  Moderate    Onset quality:  Sudden    Duration:  1 week    Timing:  Constant    Progression:  Unchanged  Chronicity:  New  Dislocation: no    Foreign body present:  No foreign bodies  Tetanus status:  " Unknown  Prior injury to area:  Unable to specify  Relieved by:  Nothing  Worsened by:  Bearing weight and activity  Ineffective treatments:  Immobilization  Associated symptoms: fatigue    Associated symptoms: no back pain, no fever and no neck pain    Risk factors: no concern for non-accidental trauma, no frequent fractures, no known bone disorder and no obesity    Risk factors comment:  Homelessness.      Review of Systems   Constitutional: Positive for appetite change and fatigue. Negative for chills and fever.        He complains of decreased appetite and fluid intake due to being homeless.  He questions whether he is dehydrated but had normal CBC and chemistries earlier today at [facility].   HENT: Negative.         No change in taste or smell.   Respiratory: Negative.    Cardiovascular: Negative.  Negative for chest pain and leg swelling.   Gastrointestinal: Negative.  Negative for abdominal pain, nausea and vomiting.   Genitourinary: Positive for dysuria and flank pain. Negative for hematuria.   Musculoskeletal: Negative for back pain and neck pain.        The patient complains of bilateral leg pain.   Skin: Positive for wound (Healing abrasion to the left knee).   Neurological: Positive for weakness. Negative for syncope, facial asymmetry and headaches.        Patient feels off balance.  No near-syncope or syncope.   Psychiatric/Behavioral: Negative for suicidal ideas.        He reported feelings of hopelessness and depression earlier today.  Cleared by behavioral health at [facility].  History of drug abuse.  Patient denies suicidal or homicidal ideation.   All other systems reviewed and are negative.      Past Medical History:   Diagnosis Date   • Anxiety    • Back pain    • Depression    • Depression with anxiety    • Diabetes mellitus (CMS/[facility])    • Fracture, humerus 03/25/2017    Right    • GERD (gastroesophageal reflux disease)    • Hyperlipidemia    • Hypertension    •  Hypothyroidism    • Leg pain    • Prostate enlargement    • PVD (peripheral vascular disease) (CMS/HCC)        No Known Allergies    Past Surgical History:   Procedure Laterality Date   • ABDOMINAL SURGERY      Exploratory   • CHOLECYSTECTOMY     • FEMORAL POPLITEAL BYPASS Left     04/19/2016   • HERNIA REPAIR      x2       Family History   Problem Relation Age of Onset   • Coronary artery disease Mother    • Depression Mother    • Osteoporosis Mother    • Hypertension Mother    • Stroke Father    • Cerebral aneurysm Father    • Diverticulitis Father        Social History     Socioeconomic History   • Marital status:      Spouse name: Not on file   • Number of children: Not on file   • Years of education: Not on file   • Highest education level: Not on file   Tobacco Use   • Smoking status: Current Every Day Smoker     Packs/day: 0.50     Years: 20.00     Pack years: 10.00     Types: Cigarettes   • Smokeless tobacco: Never Used   • Tobacco comment: Smoking 4 cigarettes a day   Substance and Sexual Activity   • Alcohol use: No   • Drug use: Yes     Comment: Previously    • Sexual activity: Defer         Objective   Physical Exam  Vitals signs and nursing note reviewed.   Constitutional:       General: He is not in acute distress.     Appearance: He is well-developed.      Comments: The patient is generally weak and appears fatigued.   HENT:      Head: Normocephalic and atraumatic.      Mouth/Throat:      Mouth: Mucous membranes are moist.      Comments: Oral mucous membranes are moist.  Eyes:      General: No scleral icterus.     Conjunctiva/sclera: Conjunctivae normal.   Neck:      Musculoskeletal: Normal range of motion and neck supple.   Cardiovascular:      Rate and Rhythm: Normal rate and regular rhythm.      Heart sounds: Normal heart sounds. No murmur.      Comments: No pedal edema to the bilateral lower extremities.  Pulmonary:      Effort: Pulmonary effort is normal. No respiratory distress.       Breath sounds: Normal breath sounds.   Abdominal:      Palpations: Abdomen is soft.      Tenderness: There is no abdominal tenderness.   Musculoskeletal: Normal range of motion.      Right hip: He exhibits no tenderness and no bony tenderness.      Left hip: He exhibits no tenderness and no bony tenderness.      Cervical back: He exhibits no tenderness and no bony tenderness.      Thoracic back: He exhibits no tenderness and no bony tenderness.      Lumbar back: He exhibits no tenderness and no bony tenderness.      Right lower leg: No edema.      Left lower leg: No edema.      Comments: No scalp injury or sign of trauma.  No cervical, thoracic, lumbar, or sacral spinal tenderness to palpation.  No pelvic or hip tenderness to palpation.  Has a healing abrasion to the left knee without evidence of infection.   Skin:     General: Skin is warm and dry.      Findings: Abrasion present.   Neurological:      General: No focal deficit present.      Mental Status: He is alert and oriented to person, place, and time.      Motor: Weakness present.      Comments: Generalized weakness. Neurologically intact and non-focal.   Psychiatric:         Mood and Affect: Mood is depressed. Affect is flat.         Behavior: Behavior normal.         Procedures         ED Course  ED Course as of Sep 21 0057   Mon Sep 21, 2020   0052 EKG showed normal sinus rhythm.  There was no evidence of ectopy, arrhythmia, or ischemia.  CBC and chemistries from earlier today at [facility] were completely normal.  CT of the brain without contrast revealed moderately severe diffuse patchy low-attenuation white matter changes, nonspecific.  No acute intracranial abnormality.  Reviewed case in detail with Dr. Jones.  Exam and vital signs are stable.  Patient has no neurologic deficits.  There is no evidence of acute infectious process.  Patient stable for discharge to the [facility].    [FC]      ED Course User Index  [FC] Johanna Andujar,  JOSE ENRIQUE     Recent Results (from the past 24 hour(s))   Comprehensive Metabolic Panel    Collection Time: 09/20/20 10:37 AM    Specimen: Blood   Result Value Ref Range    Glucose 116 (H) 65 - 99 mg/dL    BUN 12 6 - 20 mg/dL    Creatinine 1.13 0.76 - 1.27 mg/dL    Sodium 139 136 - 145 mmol/L    Potassium 3.6 3.5 - 5.2 mmol/L    Chloride 101 98 - 107 mmol/L    CO2 27.2 22.0 - 29.0 mmol/L    Calcium 9.4 8.6 - 10.5 mg/dL    Total Protein 7.3 6.0 - 8.5 g/dL    Albumin 4.30 3.50 - 5.20 g/dL    ALT (SGPT) 20 1 - 41 U/L    AST (SGOT) 18 1 - 40 U/L    Alkaline Phosphatase 88 39 - 117 U/L    Total Bilirubin 0.4 0.0 - 1.2 mg/dL    eGFR Non African Amer 67 >60 mL/min/1.73    Globulin 3.0 gm/dL    A/G Ratio 1.4 g/dL    BUN/Creatinine Ratio 10.6 7.0 - 25.0    Anion Gap 10.8 5.0 - 15.0 mmol/L   CBC Auto Differential    Collection Time: 09/20/20 10:37 AM    Specimen: Blood   Result Value Ref Range    WBC 9.71 3.40 - 10.80 10*3/mm3    RBC 5.30 4.14 - 5.80 10*6/mm3    Hemoglobin 15.5 13.0 - 17.7 g/dL    Hematocrit 45.0 37.5 - 51.0 %    MCV 84.9 79.0 - 97.0 fL    MCH 29.2 26.6 - 33.0 pg    MCHC 34.4 31.5 - 35.7 g/dL    RDW 13.0 12.3 - 15.4 %    RDW-SD 40.1 37.0 - 54.0 fl    MPV 9.0 6.0 - 12.0 fL    Platelets 269 140 - 450 10*3/mm3    Neutrophil % 71.1 42.7 - 76.0 %    Lymphocyte % 16.9 (L) 19.6 - 45.3 %    Monocyte % 7.1 5.0 - 12.0 %    Eosinophil % 4.1 0.3 - 6.2 %    Basophil % 0.5 0.0 - 1.5 %    Immature Grans % 0.3 0.0 - 0.5 %    Neutrophils, Absolute 6.90 1.70 - 7.00 10*3/mm3    Lymphocytes, Absolute 1.64 0.70 - 3.10 10*3/mm3    Monocytes, Absolute 0.69 0.10 - 0.90 10*3/mm3    Eosinophils, Absolute 0.40 0.00 - 0.40 10*3/mm3    Basophils, Absolute 0.05 0.00 - 0.20 10*3/mm3    Immature Grans, Absolute 0.03 0.00 - 0.05 10*3/mm3    nRBC 0.0 0.0 - 0.2 /100 WBC     Note: In addition to lab results from this visit, the labs listed above may include labs taken at another facility or during a different encounter within the last 24 hours.  "Please correlate lab times with ED admission and discharge times for further clarification of the services performed during this visit.    CT Head Without Contrast   Final Result   1. No acute intracranial abnormality.   2. Accelerated diffuse chronic changes as noted above.      Signer Name: Rickey Murillo MD    Signed: 9/20/2020 9:57 PM    Workstation Name: [email or web address]     [facility]        Vitals:    09/20/20 2012 09/20/20 2030 09/20/20 2100   BP: 152/93 142/82 158/84   BP Location: Right arm     Patient Position: Lying     Pulse: 83     Resp: 20     Temp: 98.2 °F (36.8 °C)     TempSrc: Oral     SpO2: 96% 95% 98%   Weight: 118 kg (260 lb)     Height: 180.3 cm (71\")       Medications - No data to display  ECG/EMG Results (last 24 hours)     Procedure Component Value Units Date/Time    ECG 12 Lead [[id number]] Collected: 09/20/20 2121     Updated: 09/20/20 2120        ECG 12 Lead                                                    MDM    Final diagnoses:   Malaise and fatigue   Generalized weakness   Personal history of fall   Homeless   History of drug use   Anxiety and depression   History of hypertension   History of hypothyroidism       Documentation assistance provided by le Lau.  Information recorded by the le was done at my direction and has been verified and validated by me.     Luca Lau  09/20/20 2147       Johanna Andujar PA-C  09/21/20 0057    "

## 2020-09-21 NOTE — DISCHARGE INSTRUCTIONS
ER evaluation reveals normal EKG and CT of the head without contrast reveals no evidence of acute stroke.  Labs, including CBC and chemistries were within normal limits earlier today at [facility].  Vital signs and exam are stable.  Discussed fall precautions.  Patient is ready for discharge back to the [facility].  Recommend close PCP follow-up for recheck.  Also gave patient follow-up information with neurology, Dr. Jackson, for recheck.  I also gave patient follow-up for substance abuse help here in the [place] area, as well.